# Patient Record
Sex: MALE | Race: WHITE | Employment: UNEMPLOYED | URBAN - METROPOLITAN AREA
[De-identification: names, ages, dates, MRNs, and addresses within clinical notes are randomized per-mention and may not be internally consistent; named-entity substitution may affect disease eponyms.]

---

## 2017-02-13 ENCOUNTER — HOSPITAL ENCOUNTER (EMERGENCY)
Age: 57
Discharge: HOME | End: 2017-02-13
Attending: EMERGENCY MEDICINE
Payer: MEDICAID

## 2017-02-13 VITALS
WEIGHT: 153.66 LBS | DIASTOLIC BLOOD PRESSURE: 72 MMHG | RESPIRATION RATE: 18 BRPM | BODY MASS INDEX: 20.81 KG/M2 | HEIGHT: 72 IN | SYSTOLIC BLOOD PRESSURE: 132 MMHG | HEART RATE: 62 BPM | OXYGEN SATURATION: 96 %

## 2017-02-13 DIAGNOSIS — R40.4 TRANSIENT ALTERATION OF AWARENESS: Primary | ICD-10-CM

## 2017-02-13 LAB
ANION GAP BLD CALC-SCNC: 11 MMOL/L (ref 5–15)
BASOPHILS # BLD AUTO: 0 K/UL
BASOPHILS # BLD: 0 %
BUN SERPL-MCNC: 9 MG/DL (ref 6–20)
BUN/CREAT SERPL: 13 (ref 12–20)
CALCIUM SERPL-MCNC: 8.1 MG/DL (ref 8.5–10.1)
CHLORIDE SERPL-SCNC: 90 MMOL/L (ref 97–108)
CO2 SERPL-SCNC: 29 MMOL/L (ref 21–32)
CREAT SERPL-MCNC: 0.7 MG/DL (ref 0.7–1.3)
DIFFERENTIAL METHOD BLD: ABNORMAL
EOSINOPHIL # BLD: 0 K/UL
EOSINOPHIL NFR BLD: 0 %
ERYTHROCYTE [DISTWIDTH] IN BLOOD BY AUTOMATED COUNT: 12.8 % (ref 11.5–14.5)
GLUCOSE SERPL-MCNC: 132 MG/DL (ref 65–100)
HCT VFR BLD AUTO: 36.1 % (ref 36.6–50.3)
HGB BLD-MCNC: 12.6 G/DL (ref 12.1–17)
LYMPHOCYTES # BLD AUTO: 15 %
LYMPHOCYTES # BLD: 2.9 K/UL
MCH RBC QN AUTO: 30.9 PG (ref 26–34)
MCHC RBC AUTO-ENTMCNC: 34.9 G/DL (ref 30–36.5)
MCV RBC AUTO: 88.5 FL (ref 80–99)
MONOCYTES # BLD: 1.4 K/UL
MONOCYTES NFR BLD AUTO: 7 %
NEUTS BAND NFR BLD MANUAL: 3 %
NEUTS SEG # BLD: 15.2 K/UL
NEUTS SEG NFR BLD AUTO: 75 %
NRBC # BLD: 0.12 K/UL (ref 0–0.01)
NRBC BLD-RTO: 0.6 PER 100 WBC
PLATELET # BLD AUTO: 536 K/UL (ref 150–400)
POTASSIUM SERPL-SCNC: 3.2 MMOL/L (ref 3.5–5.1)
RBC # BLD AUTO: 4.08 M/UL (ref 4.1–5.7)
RBC MORPH BLD: ABNORMAL
SODIUM SERPL-SCNC: 130 MMOL/L (ref 136–145)
WBC # BLD AUTO: 19.5 K/UL (ref 4.1–11.1)
WBC MORPH BLD: ABNORMAL
WBC NRBC COR # BLD: ABNORMAL 10*3/UL

## 2017-02-13 PROCEDURE — 85025 COMPLETE CBC W/AUTO DIFF WBC: CPT

## 2017-02-13 PROCEDURE — 96374 THER/PROPH/DIAG INJ IV PUSH: CPT

## 2017-02-13 PROCEDURE — 36415 COLL VENOUS BLD VENIPUNCTURE: CPT

## 2017-02-13 PROCEDURE — 74011000258 HC RX REV CODE- 258: Performed by: EMERGENCY MEDICINE

## 2017-02-13 PROCEDURE — 74011250636 HC RX REV CODE- 250/636: Performed by: EMERGENCY MEDICINE

## 2017-02-13 PROCEDURE — 80048 BASIC METABOLIC PNL TOTAL CA: CPT

## 2017-02-13 PROCEDURE — 99284 EMERGENCY DEPT VISIT MOD MDM: CPT

## 2017-02-13 RX ADMIN — LEVETIRACETAM 1000 MG: 100 INJECTION, SOLUTION, CONCENTRATE INTRAVENOUS at 13:58

## 2017-02-13 NOTE — ED TRIAGE NOTES
Pt. Calm and relaxed. Respirations unlabored and vitals within normal parameters.  Pt. Has poor recollection of why EMS was dispatched

## 2017-02-13 NOTE — ED PROVIDER NOTES
Patient is a 55 y.o. male presenting with altered mental status. The history is provided by the patient and the EMS personnel. No  was used. Altered mental status    This is a recurrent problem. The current episode started less than 1 hour ago. The problem has been resolved. Associated symptoms include seizures and unresponsiveness. Pertinent negatives include no weakness and no agitation. Confusion: resolved. Associated symptoms comments: Fecal incontinence. Mental status baseline is normal.  His past medical history is significant for diabetes, seizures, depression and heart disease. Past Medical History:   Diagnosis Date    Bipolar 1 disorder ([address]     Fluid retention     GERD (gastroesophageal reflux disease)     Hypertension     Other ill-defined conditions(799.89)      pancreatic stones    Other ill-defined conditions(799.89)      chronic pain    Psychiatric disorder      depression       Past Surgical History:   Procedure Laterality Date    Hx other surgical       spleen and part of pancreas removed         History reviewed. No pertinent family history. Social History     Social History    Marital status:      Spouse name: N/A    Number of children: N/A    Years of education: N/A     Occupational History    Not on file. Social History Main Topics    Smoking status: Heavy Tobacco Smoker     Packs/day: 1.00     Types: Cigarettes    Smokeless tobacco: Not on file      Comment: 30 cigarettes a day    Alcohol use No    Drug use: No    Sexual activity: Not on file     Other Topics Concern    Not on file     Social History Narrative    ** Merged History Encounter **              ALLERGIES: Iodinated contrast media - oral and iv dye; Iodine; and Zofran [ondansetron hcl (pf)]    Review of Systems   Constitutional: Negative for chills and fever. HENT: Negative for congestion and sore throat. Eyes: Negative for pain and visual disturbance.    Respiratory: Negative for cough and shortness of breath. Cardiovascular: Negative for chest pain and palpitations. Gastrointestinal: Negative for abdominal pain, constipation, diarrhea and nausea. Endocrine: Negative for polydipsia and polyuria. Genitourinary: Negative for dysuria and hematuria. Musculoskeletal: Negative for back pain and neck pain. Neurological: Positive for seizures. Negative for weakness and headaches. Psychiatric/Behavioral: Negative for agitation. Confusion: resolved. Vitals:    02/13/17 1206   BP: 114/66   Pulse: 62   Resp: 18   SpO2: 98%   Weight: 69.7 kg (153 lb 10.6 oz)   Height: 6' (1.829 m)            Physical Exam   Constitutional: He is oriented to person, place, and time. He appears well-developed and well-nourished. HENT:   Head: Normocephalic and atraumatic. Eyes: Conjunctivae are normal. Pupils are equal, round, and reactive to light. Neck: Normal range of motion. Neck supple. Cardiovascular: Normal rate, regular rhythm and normal heart sounds. Pulmonary/Chest: Effort normal and breath sounds normal.   Abdominal: Soft. He exhibits no distension. There is no tenderness. Musculoskeletal: Normal range of motion. Neurological: He is alert and oriented to person, place, and time. No cranial nerve deficit. Coordination normal.   Skin: Skin is warm and dry. No rash noted. Psychiatric: He has a normal mood and affect. His behavior is normal. Thought content normal.        MDM  Number of Diagnoses or Management Options  Diagnosis management comments: 56 yo M with hx HTN, DM, and recent hx of seizure disorder presents with altered mental status. Currently at baseline, per EMS report he was A&O only to self at group home and improved en route. + fecal incontinence. With rapid improvement and described sx seizure is likely. Had inpatient workup in June 2016, CT head normal and EEG WNL. No need for CT imaging in light of previous scan and normal neuro exam presently.  Pt states he is no longer taking buproprion. Will load with keppra 1g IV. Will check basic labs to evaluate for organic cause of AMS i.e. Electrolyte imbalance. Low suspicion for infection based on presentation. Risk of Complications, Morbidity, and/or Mortality  Presenting problems: moderate  Diagnostic procedures: minimal  Management options: low    Patient Progress  Patient progress: improved    ED Course   2:33 PM  Discussed situation with patient and his sister who is now present. As patient has had inpatient workup would likely not benefit from repeated admission at this time. He is at his baseline, has finished keppra load. Patient desires to seek remainder of his care at [facility] where his other specialists are. Will discharge. Procedures       LABORATORY TESTS:  No results found for this or any previous visit (from the past 12 hour(s)). IMAGING RESULTS:  No orders to display       MEDICATIONS GIVEN:  Medications   levETIRAcetam (KEPPRA) 1,000 mg in 0.9% sodium chloride IVPB (0 mg IntraVENous IV Completed 2/13/17 1413)       IMPRESSION:  1. Transient alteration of awareness        PLAN:  1. Discharge Medication List as of 2/13/2017  2:55 PM      CONTINUE these medications which have NOT CHANGED    Details   ibuprofen (ADVIL) 200 mg tablet Take  by mouth., Historical Med      lisinopril (PRINIVIL, ZESTRIL) 20 mg tablet Take 1 Tab by mouth daily. , Print, Disp-30 Tab, R-0      isosorbide mononitrate ER (IMDUR) 30 mg tablet Take 30 mg by mouth daily. , Historical Med      DULoxetine (CYMBALTA) 30 mg capsule Take 30 mg by mouth daily. , Historical Med      buPROPion SR (WELLBUTRIN SR) 100 mg SR tablet Take  by mouth., Historical Med      ARIPiprazole (ABILIFY) 15 mg tablet Take 15 mg by mouth daily. , Historical Med      divalproex ER (DEPAKOTE ER) 500 mg ER tablet Take 1,500 mg by mouth., Historical Med      traMADol (ULTRAM) 50 mg tablet Take 100 mg by mouth three (3) times daily as needed for Pain., Historical Med      pregabalin (LYRICA) 100 mg capsule Take  by mouth three (3) times daily. , Historical Med      insulin glargine (LANTUS SOLOSTAR) 100 unit/mL (3 mL) pen 18 Units by SubCUTAneous route daily. , Print, Disp-1 Package, R-0      atorvastatin (LIPITOR) 20 mg tablet Take 20 mg by mouth daily. , Historical Med      insulin lispro (HUMALOG) 100 unit/mL injection 4 Units by SubCUTAneous route Before breakfast, lunch, and dinner., Historical Med      lipase-protease-amylase (CREON) 36,000-114,000- 180,000 unit cpDR Take 1 capsule by mouth three (3) times daily (with meals). , Historical Med      carvedilol (COREG) 25 mg tablet Take 25 mg by mouth every twelve (12) hours every twelve (12) hours. , Historical Med           2.    Follow-up Information     Follow up With Details Comments [id number] Luana Tulou, MD In 1 week If symptoms worsen return to [address] [place] [address]           Return to ED if worse

## 2017-02-13 NOTE — ED NOTES
Pt remained in bathroom with running water, bathroom unlocked and nurse entered to assist patient with toileting and cleansed legs and rectum of soft stool, pt provided clean underwear and paper scrubs for comfort, ambulated with steady gait to room 39 where patient was meet by primary nurse, pt had white tennis shoes and cell phone in his possession, dirty pants and underwear placed in belonging bag

## 2018-02-09 ENCOUNTER — HOSPITAL ENCOUNTER (EMERGENCY)
Age: 58
Discharge: HOME | End: 2018-02-09
Attending: EMERGENCY MEDICINE
Payer: MEDICAID

## 2018-02-09 VITALS
SYSTOLIC BLOOD PRESSURE: 163 MMHG | TEMPERATURE: 97.5 F | OXYGEN SATURATION: 96 % | RESPIRATION RATE: 18 BRPM | HEART RATE: 58 BPM | DIASTOLIC BLOOD PRESSURE: 79 MMHG

## 2018-02-09 DIAGNOSIS — E16.2 HYPOGLYCEMIA: Primary | ICD-10-CM

## 2018-02-09 LAB
ALBUMIN SERPL-MCNC: 3.5 G/DL (ref 3.5–5)
ALBUMIN/GLOB SERPL: 1 {RATIO} (ref 1.1–2.2)
ALP SERPL-CCNC: 69 U/L (ref 45–117)
ALT SERPL-CCNC: 27 U/L (ref 12–78)
AMPHET UR QL SCN: NEGATIVE
ANION GAP SERPL CALC-SCNC: 5 MMOL/L (ref 5–15)
APAP SERPL-MCNC: <2 UG/ML (ref 10–30)
APPEARANCE UR: CLEAR
AST SERPL-CCNC: 27 U/L (ref 15–37)
BACTERIA URNS QL MICRO: NEGATIVE /HPF
BARBITURATES UR QL SCN: NEGATIVE
BASOPHILS # BLD: 0.1 K/UL (ref 0–0.1)
BASOPHILS NFR BLD: 1 % (ref 0–1)
BENZODIAZ UR QL: NEGATIVE
BILIRUB SERPL-MCNC: 0.2 MG/DL (ref 0.2–1)
BILIRUB UR QL: NEGATIVE
BUN SERPL-MCNC: 17 MG/DL (ref 6–20)
BUN/CREAT SERPL: 18 (ref 12–20)
CALCIUM SERPL-MCNC: 9.1 MG/DL (ref 8.5–10.1)
CANNABINOIDS UR QL SCN: NEGATIVE
CHLORIDE SERPL-SCNC: 103 MMOL/L (ref 97–108)
CO2 SERPL-SCNC: 32 MMOL/L (ref 21–32)
COCAINE UR QL SCN: NEGATIVE
COLOR UR: ABNORMAL
CREAT SERPL-MCNC: 0.96 MG/DL (ref 0.7–1.3)
DIFFERENTIAL METHOD BLD: ABNORMAL
DRUG SCRN COMMENT,DRGCM: NORMAL
EOSINOPHIL # BLD: 0.7 K/UL (ref 0–0.4)
EOSINOPHIL NFR BLD: 7 % (ref 0–7)
EPITH CASTS URNS QL MICRO: ABNORMAL /LPF
ERYTHROCYTE [DISTWIDTH] IN BLOOD BY AUTOMATED COUNT: 13.2 % (ref 11.5–14.5)
ETHANOL SERPL-MCNC: <10 MG/DL
GLOBULIN SER CALC-MCNC: 3.6 G/DL (ref 2–4)
GLUCOSE BLD STRIP.AUTO-MCNC: 190 MG/DL (ref 65–100)
GLUCOSE BLD STRIP.AUTO-MCNC: 61 MG/DL (ref 65–100)
GLUCOSE BLD STRIP.AUTO-MCNC: 66 MG/DL (ref 65–100)
GLUCOSE BLD STRIP.AUTO-MCNC: 73 MG/DL (ref 65–100)
GLUCOSE BLD STRIP.AUTO-MCNC: 94 MG/DL (ref 65–100)
GLUCOSE SERPL-MCNC: 37 MG/DL (ref 65–100)
GLUCOSE UR STRIP.AUTO-MCNC: NEGATIVE MG/DL
HCT VFR BLD AUTO: 39.4 % (ref 36.6–50.3)
HGB BLD-MCNC: 13.3 G/DL (ref 12.1–17)
HGB UR QL STRIP: NEGATIVE
HYALINE CASTS URNS QL MICRO: ABNORMAL /LPF (ref 0–5)
IMM GRANULOCYTES # BLD: 0 K/UL (ref 0–0.04)
IMM GRANULOCYTES NFR BLD AUTO: 0 % (ref 0–0.5)
KETONES UR QL STRIP.AUTO: NEGATIVE MG/DL
LEUKOCYTE ESTERASE UR QL STRIP.AUTO: NEGATIVE
LYMPHOCYTES # BLD: 3.9 K/UL (ref 0.8–3.5)
LYMPHOCYTES NFR BLD: 38 % (ref 12–49)
MAGNESIUM SERPL-MCNC: 2.1 MG/DL (ref 1.6–2.4)
MCH RBC QN AUTO: 31.2 PG (ref 26–34)
MCHC RBC AUTO-ENTMCNC: 33.8 G/DL (ref 30–36.5)
MCV RBC AUTO: 92.5 FL (ref 80–99)
METHADONE UR QL: NEGATIVE
MONOCYTES # BLD: 1.1 K/UL (ref 0–1)
MONOCYTES NFR BLD: 11 % (ref 5–13)
NEUTS SEG # BLD: 4.4 K/UL (ref 1.8–8)
NEUTS SEG NFR BLD: 43 % (ref 32–75)
NITRITE UR QL STRIP.AUTO: NEGATIVE
NRBC # BLD: 0 K/UL (ref 0–0.01)
NRBC BLD-RTO: 0 PER 100 WBC
OPIATES UR QL: NEGATIVE
PCP UR QL: NEGATIVE
PH UR STRIP: 6.5 [PH] (ref 5–8)
PLATELET # BLD AUTO: 213 K/UL (ref 150–400)
PMV BLD AUTO: 13.3 FL (ref 8.9–12.9)
POTASSIUM SERPL-SCNC: 4.3 MMOL/L (ref 3.5–5.1)
PROT SERPL-MCNC: 7.1 G/DL (ref 6.4–8.2)
PROT UR STRIP-MCNC: 100 MG/DL
RBC # BLD AUTO: 4.26 M/UL (ref 4.1–5.7)
RBC #/AREA URNS HPF: ABNORMAL /HPF (ref 0–5)
SALICYLATES SERPL-MCNC: 2.5 MG/DL (ref 2.8–20)
SERVICE CMNT-IMP: ABNORMAL
SERVICE CMNT-IMP: ABNORMAL
SERVICE CMNT-IMP: NORMAL
SODIUM SERPL-SCNC: 140 MMOL/L (ref 136–145)
SP GR UR REFRACTOMETRY: 1.01 (ref 1–1.03)
UA: UC IF INDICATED,UAUC: ABNORMAL
UROBILINOGEN UR QL STRIP.AUTO: 0.2 EU/DL (ref 0.2–1)
WBC # BLD AUTO: 10.3 K/UL (ref 4.1–11.1)
WBC URNS QL MICRO: ABNORMAL /HPF (ref 0–4)

## 2018-02-09 PROCEDURE — 80053 COMPREHEN METABOLIC PANEL: CPT | Performed by: EMERGENCY MEDICINE

## 2018-02-09 PROCEDURE — 85025 COMPLETE CBC W/AUTO DIFF WBC: CPT | Performed by: EMERGENCY MEDICINE

## 2018-02-09 PROCEDURE — 83735 ASSAY OF MAGNESIUM: CPT | Performed by: EMERGENCY MEDICINE

## 2018-02-09 PROCEDURE — 99285 EMERGENCY DEPT VISIT HI MDM: CPT

## 2018-02-09 PROCEDURE — 82962 GLUCOSE BLOOD TEST: CPT

## 2018-02-09 PROCEDURE — 80307 DRUG TEST PRSMV CHEM ANLYZR: CPT | Performed by: EMERGENCY MEDICINE

## 2018-02-09 PROCEDURE — 81001 URINALYSIS AUTO W/SCOPE: CPT | Performed by: EMERGENCY MEDICINE

## 2018-02-09 PROCEDURE — 36415 COLL VENOUS BLD VENIPUNCTURE: CPT | Performed by: EMERGENCY MEDICINE

## 2018-02-09 RX ORDER — DEXTROSE 50 % IN WATER (D50W) INTRAVENOUS SYRINGE
25
Status: DISCONTINUED | OUTPATIENT
Start: 2018-02-09 | End: 2018-02-09 | Stop reason: HOSPADM

## 2018-02-09 RX ORDER — DICYCLOMINE HYDROCHLORIDE 20 MG/1
20 TABLET ORAL EVERY 6 HOURS
COMMUNITY

## 2018-02-09 RX ORDER — GUAIFENESIN 100 MG/5ML
81 LIQUID (ML) ORAL DAILY
COMMUNITY

## 2018-02-09 RX ORDER — ARIPIPRAZOLE 15 MG/1
15 TABLET ORAL DAILY
COMMUNITY

## 2018-02-09 NOTE — ED NOTES
Discharge instructions reviewed with patient. Discharge instructions given to patient per Dr. Shahrzad Gerard. Patient able to return/verbalize discharge instructions. Copy of discharge instructions provided. Patient condition stable, respiratory status within normal limits, neuro status intact. Ambulatory out of ER. Patient provided with cab voucher.

## 2018-02-09 NOTE — ED NOTES
Bedside and Verbal shift change report given to Nena Irvin (oncoming nurse) by Savage Salcedo (offgoing nurse). Report included the following information SBAR, Kardex and Intake/Output.

## 2018-02-09 NOTE — DISCHARGE INSTRUCTIONS
Hypoglycemia: Care Instructions  Your Care Instructions    Hypoglycemia means that your blood sugar is low and your body is not getting enough fuel. Some people get low blood sugar from not eating often enough. Some medicines to treat diabetes can cause low blood sugar. People who have had surgery on their stomachs or intestines may get hypoglycemia. Problems with the pancreas, kidneys, or liver also can cause low blood sugar. A snack or drink with sugar in it will raise your blood sugar and should ease your symptoms right away. Your doctor may recommend that you change or stop your medicines until you can get your blood sugar levels under control. In the long run, you may need to change your diet and eating habits so that you get enough fuel for your body throughout the day. Follow-up care is a key part of your treatment and safety. Be sure to make and go to all appointments, and call your doctor if you are having problems. It's also a good idea to know your test results and keep a list of the medicines you take. How can you care for yourself at home? · Learn to recognize the early signs of low blood sugar. Signs include:  ¨ Nausea. ¨ Hunger. ¨ Feeling nervous, irritable, or shaky. ¨ Cold, clammy, wet skin. ¨ Sweating (when you are not exercising). ¨ A fast heartbeat. ¨ Numbness or tingling of the fingertips or lips. · If you feel an episode of low blood sugar coming on, drink fruit juice or sugared (not diet) soda, or eat sugar in the form of candy, cubes, or tablets. [address] are another [employer]. · Eat small, frequent meals so that you do not get too hungry between meals. · Balance extra exercise with eating more. · Keep a written record of your low blood sugar episodes, including when you last ate and what you ate, so that you can learn what causes your blood sugar to drop.   · Make sure your family, friends, and coworkers know the symptoms of low blood sugar and know what to do to get your sugar level up. · Wear medical alert jewelry that lists your condition. You can buy this at most drugstores. When should you call for help? Call 911 anytime you think you may need emergency care. For example, call if:  ? · You passed out (lost consciousness). ? · You are confused or cannot think clearly. ? · Your blood sugar is very high or very low. ? Watch closely for changes in your health, and be sure to contact your doctor if:  ? · Your blood sugar stays outside the level your doctor set for you. ? · You have any problems. Where can you learn more? Go to http://nicolas-tori.info/. Enter P880 in the search box to learn more about \"Hypoglycemia: Care Instructions. \"  Current as of: March 13, 2017  Content Version: 11.4  © 1468-1198 [employer]. Care instructions adapted under license by [employer] (which disclaims liability or warranty for this information). If you have questions about a medical condition or this instruction, always ask your healthcare professional. [place] [address] any warranty or liability for your use of this information.

## 2018-02-09 NOTE — ED NOTES
Unable to obtain an iv on patient. Dr. Mela Powell aware and okay with treating patient low blood sugar by mouth.  Pt was given apple juice at this time and will recheck blood sugar

## 2018-02-09 NOTE — ED NOTES
RN spoke to Juan about providing patient with cab ticket in order to get back to his vehicle. Primary RN made aware.

## 2018-02-09 NOTE — ED PROVIDER NOTES
EMERGENCY DEPARTMENT HISTORY AND PHYSICAL EXAM      Date: 2/9/2018  Patient Name: Geni Doyle    History of Presenting Illness     Chief Complaint   Patient presents with    Low Blood Sugar     Pt came in via EMS for c/o hypogylcemia whie out shopping Per patient he is a bad diabetic and has been having on going problems with his blood sugar. Per the patient he overdoses himself on insulin to kill himself. History Provided By: Patient    HPI: Geni Doyle, 65 y.o. male with PMHx significant for HTN, bipolar disorder, and and depression, presents via EMS to the ED for evaluation of low blood sugar. Pt reports he took his normal dose of insulin before breakfast this morning (12 units of Humalog prior to each meal) but only ate 3 boiled eggs, which is a low amount of intake for him. He called EMS because he was \"not feeling good\" due to low blood sugar. Pt denies any SI/HI or harmful intent today, but notes he has a hx of SI. Pt states his doctor recently increased his insulin by 2 units two weeks ago and he has not had any issues; he cites low food intake as the cause of this event. Pt has no complaints but notes he feels \"tired. \" He states he also takes 12 units of Lantis each night. Pt specifically denies any LOC, abdominal pain, nausea, vomiting, and diarrhea. PCP: Sussy Logan MD    There are no other complaints, changes, or physical findings at this time. Current Facility-Administered Medications   Medication Dose Route Frequency Provider Last Rate Last Dose    dextrose (D50W) injection syrg 25 g  25 g IntraVENous NOW Stacy Mendoza MD         Current Outpatient Prescriptions   Medication Sig Dispense Refill    ARIPiprazole (ABILIFY) 15 mg tablet Take 15 mg by mouth daily.  aspirin 81 mg chewable tablet Take 81 mg by mouth daily.  dicyclomine (BENTYL) 20 mg tablet Take 20 mg by mouth every six (6) hours.  ibuprofen (ADVIL) 200 mg tablet Take  by mouth.       lisinopril (PRINIVIL, ZESTRIL) 20 mg tablet Take 1 Tab by mouth daily. 30 Tab 0    DULoxetine (CYMBALTA) 30 mg capsule Take 30 mg by mouth daily.  ARIPiprazole (ABILIFY) 15 mg tablet Take 15 mg by mouth daily.  traMADol (ULTRAM) 50 mg tablet Take 100 mg by mouth three (3) times daily as needed for Pain.  pregabalin (LYRICA) 100 mg capsule Take  by mouth three (3) times daily.  insulin glargine (LANTUS SOLOSTAR) 100 unit/mL (3 mL) pen 18 Units by SubCUTAneous route daily. 1 Package 0    atorvastatin (LIPITOR) 20 mg tablet Take 20 mg by mouth daily.  insulin lispro (HUMALOG) 100 unit/mL injection 4 Units by SubCUTAneous route Before breakfast, lunch, and dinner.  lipase-protease-amylase (CREON) 36,000-114,000- 180,000 unit cpDR Take 1 capsule by mouth three (3) times daily (with meals).  carvedilol (COREG) 25 mg tablet Take 25 mg by mouth every twelve (12) hours every twelve (12) hours.  isosorbide mononitrate ER (IMDUR) 30 mg tablet Take 30 mg by mouth daily.  buPROPion SR (WELLBUTRIN SR) 100 mg SR tablet Take  by mouth.  divalproex ER (DEPAKOTE ER) 500 mg ER tablet Take 1,500 mg by mouth. Past History     Past Medical History:  Past Medical History:   Diagnosis Date    Bipolar 1 disorder ([facility] [address]     Fluid retention     GERD (gastroesophageal reflux disease)     Hypertension     Other ill-defined conditions(799.89)     pancreatic stones    Other ill-defined conditions(799.89)     chronic pain    Psychiatric disorder     depression       Past Surgical History:  Past Surgical History:   Procedure Laterality Date    HX OTHER SURGICAL      spleen and part of pancreas removed       Family History:  History reviewed. No pertinent family history.     Social History:  Social History   Substance Use Topics    Smoking status: Heavy Tobacco Smoker     Packs/day: 1.00     Types: Cigarettes    Smokeless tobacco: Never Used      Comment: 30 cigarettes a day    Alcohol use No Allergies: Allergies   Allergen Reactions    Iodinated Contrast- Oral And Iv Dye Rash    Iodine Rash    Zofran [Ondansetron Hcl (Pf)] Rash         Review of Systems   Review of Systems   Constitutional: Negative for activity change, chills and fever.        + low blood sugar   HENT: Negative for congestion and sore throat. Eyes: Negative for pain and redness. Respiratory: Negative for cough, chest tightness and shortness of breath. Cardiovascular: Negative for chest pain and palpitations. Gastrointestinal: Negative for abdominal pain, diarrhea, nausea and vomiting. Genitourinary: Negative for dysuria, frequency and urgency. Musculoskeletal: Negative for back pain and neck pain. Skin: Negative for rash. Neurological: Negative for syncope, light-headedness and headaches. Psychiatric/Behavioral: Negative for confusion. All other systems reviewed and are negative. Physical Exam   Physical Exam   Constitutional: He is oriented to person, place, and time. He appears well-developed and well-nourished. No distress. HENT:   Head: Normocephalic. Nose: Nose normal.   Mouth/Throat: Oropharynx is clear and moist. No oropharyngeal exudate. Eyes: Conjunctivae are normal. Pupils are equal, round, and reactive to light. No scleral icterus. Neck: Normal range of motion. Neck supple. No JVD present. No tracheal deviation present. No thyromegaly present. Cardiovascular: Normal rate, regular rhythm and intact distal pulses. Exam reveals no gallop and no friction rub. No murmur heard. Pulmonary/Chest: Effort normal and breath sounds normal. No stridor. No respiratory distress. He has no wheezes. He has no rales. Abdominal: Soft. Bowel sounds are normal. He exhibits no distension. There is no tenderness. There is no rebound and no guarding. Musculoskeletal: Normal range of motion. He exhibits no edema. Lymphadenopathy:     He has no cervical adenopathy.    Neurological: He is alert and oriented to person, place, and time. No cranial nerve deficit. He exhibits normal muscle tone. Coordination normal.   Skin: Skin is warm and dry. No rash noted. He is not diaphoretic. No erythema. Psychiatric: He has a normal mood and affect. His behavior is normal.   Denies SI/HI, denies AH/VH. Nursing note and vitals reviewed. Diagnostic Study Results     Labs -     Recent Results (from the past 12 hour(s))   GLUCOSE, POC    Collection Time: 02/09/18 10:53 AM   Result Value Ref Range    Glucose (POC) 61 (L) 65 - 100 mg/dL    Performed by Sonal Mccartney    CBC WITH AUTOMATED DIFF    Collection Time: 02/09/18 11:12 AM   Result Value Ref Range    WBC 10.3 4.1 - 11.1 K/uL    RBC 4.26 4.10 - 5.70 M/uL    HGB 13.3 12.1 - 17.0 g/dL    HCT 39.4 36.6 - 50.3 %    MCV 92.5 80.0 - 99.0 FL    MCH 31.2 26.0 - 34.0 PG    MCHC 33.8 30.0 - 36.5 g/dL    RDW 13.2 11.5 - 14.5 %    PLATELET 831 850 - 345 K/uL    MPV 13.3 (H) 8.9 - 12.9 FL    NRBC 0.0 0  WBC    ABSOLUTE NRBC 0.00 0.00 - 0.01 K/uL    NEUTROPHILS 43 32 - 75 %    LYMPHOCYTES 38 12 - 49 %    MONOCYTES 11 5 - 13 %    EOSINOPHILS 7 0 - 7 %    BASOPHILS 1 0 - 1 %    IMMATURE GRANULOCYTES 0 0.0 - 0.5 %    ABS. NEUTROPHILS 4.4 1.8 - 8.0 K/UL    ABS. LYMPHOCYTES 3.9 (H) 0.8 - 3.5 K/UL    ABS. MONOCYTES 1.1 (H) 0.0 - 1.0 K/UL    ABS. EOSINOPHILS 0.7 (H) 0.0 - 0.4 K/UL    ABS. BASOPHILS 0.1 0.0 - 0.1 K/UL    ABS. IMM.  GRANS. 0.0 0.00 - 0.04 K/UL    DF AUTOMATED     ETHYL ALCOHOL    Collection Time: 02/09/18 11:12 AM   Result Value Ref Range    ALCOHOL(ETHYL),SERUM <10 <10 MG/DL   ACETAMINOPHEN    Collection Time: 02/09/18 11:12 AM   Result Value Ref Range    Acetaminophen level <2 (L) 10 - 30 ug/mL   SALICYLATE    Collection Time: 02/09/18 11:12 AM   Result Value Ref Range    Salicylate level 2.5 (L) 2.8 - 20.0 MG/DL   GLUCOSE, POC    Collection Time: 02/09/18 11:25 AM   Result Value Ref Range    Glucose (POC) 73 65 - 100 mg/dL    Performed by Hari Llanes Lesley    URINALYSIS W/ REFLEX CULTURE    Collection Time: 02/09/18 11:52 AM   Result Value Ref Range    Color YELLOW/STRAW      Appearance CLEAR CLEAR      Specific gravity 1.009 1.003 - 1.030      pH (UA) 6.5 5.0 - 8.0      Protein 100 (A) NEG mg/dL    Glucose NEGATIVE  NEG mg/dL    Ketone NEGATIVE  NEG mg/dL    Bilirubin NEGATIVE  NEG      Blood NEGATIVE  NEG      Urobilinogen 0.2 0.2 - 1.0 EU/dL    Nitrites NEGATIVE  NEG      Leukocyte Esterase NEGATIVE  NEG      WBC 0-4 0 - 4 /hpf    RBC 0-5 0 - 5 /hpf    Epithelial cells FEW FEW /lpf    Bacteria NEGATIVE  NEG /hpf    UA:UC IF INDICATED CULTURE NOT INDICATED BY UA RESULT CNI      Hyaline cast 0-2 0 - 5 /lpf   DRUG SCREEN, URINE    Collection Time: 02/09/18 11:52 AM   Result Value Ref Range    AMPHETAMINES NEGATIVE  NEG      BARBITURATES NEGATIVE  NEG      BENZODIAZEPINES NEGATIVE  NEG      COCAINE NEGATIVE  NEG      METHADONE NEGATIVE  NEG      OPIATES NEGATIVE  NEG      PCP(PHENCYCLIDINE) NEGATIVE  NEG      THC (TH-CANNABINOL) NEGATIVE  NEG      Drug screen comment (NOTE)    GLUCOSE, POC    Collection Time: 02/09/18 11:59 AM   Result Value Ref Range    Glucose (POC) 66 65 - 100 mg/dL    Performed by Nicky Holt    METABOLIC PANEL, COMPREHENSIVE    Collection Time: 02/09/18 12:08 PM   Result Value Ref Range    Sodium 140 136 - 145 mmol/L    Potassium 4.3 3.5 - 5.1 mmol/L    Chloride 103 97 - 108 mmol/L    CO2 32 21 - 32 mmol/L    Anion gap 5 5 - 15 mmol/L    Glucose 37 (LL) 65 - 100 mg/dL    BUN 17 6 - 20 MG/DL    Creatinine 0.96 0.70 - 1.30 MG/DL    BUN/Creatinine ratio 18 12 - 20      GFR est AA >60 >60 ml/min/1.73m2    GFR est non-AA >60 >60 ml/min/1.73m2    Calcium 9.1 8.5 - 10.1 MG/DL    Bilirubin, total 0.2 0.2 - 1.0 MG/DL    ALT (SGPT) 27 12 - 78 U/L    AST (SGOT) 27 15 - 37 U/L    Alk.  phosphatase 69 45 - 117 U/L    Protein, total 7.1 6.4 - 8.2 g/dL    Albumin 3.5 3.5 - 5.0 g/dL    Globulin 3.6 2.0 - 4.0 g/dL    A-G Ratio 1.0 (L) 1.1 - 2.2 MAGNESIUM    Collection Time: 02/09/18 12:08 PM   Result Value Ref Range    Magnesium 2.1 1.6 - 2.4 mg/dL   GLUCOSE, POC    Collection Time: 02/09/18 12:35 PM   Result Value Ref Range    Glucose (POC) 94 65 - 100 mg/dL    Performed by Sameer Estrada    GLUCOSE, POC    Collection Time: 02/09/18  2:31 PM   Result Value Ref Range    Glucose (POC) 190 (H) 65 - 100 mg/dL    Performed by Eduardo Francesca (SON)        Medical Decision Making   I am the first provider for this patient. I reviewed the vital signs, available nursing notes, past medical history, past surgical history, family history and social history. Vital Signs-Reviewed the patient's vital signs. Patient Vitals for the past 12 hrs:   Temp Pulse Resp BP SpO2   02/09/18 1100 97.5 °F (36.4 °C) (!) 58 18 135/64 100 %     Pulse Oximetry Analysis - 99% on RA    Cardiac Monitor:   Rate: 60 bpm    Records Reviewed: Nursing Notes, Old Medical Records and Ambulance Run Sheet    Provider Notes (Medical Decision Making):   DDx: Hypoglycemia, metabolic abnormality. ED Course:   Initial assessment performed. The patients presenting problems have been discussed, and they are in agreement with the care plan formulated and outlined with them. I have encouraged them to ask questions as they arise throughout their visit. Disposition:  DISCHARGE NOTE  2:56 PM  The patient has been re-evaluated and is ready for discharge. Blood sugar normalized; pt denied any harmful/suicidal intent, just simply didn't eat as much as usual.  Reviewed available results with patient. Counseled patient on diagnosis and care plan. Patient has expressed understanding, and all questions have been answered. Patient agrees with plan and agrees to follow up as recommended, or return to the ED if their symptoms worsen. Discharge instructions have been provided and explained to the patient, along with reasons to return to the ED. PLAN:  1.    Current Discharge Medication List 2.   Follow-up Information     Follow up With Details Comments Kevin Vinson MD Schedule an appointment as soon as possible for a visit in 3 days  [address]  [address] [facility] [address]  [phone]      [facility] EMERGENCY DEPT Go in 1 day If symptoms worsen [address] [place] [address]  [phone]        Return to ED if worse     Diagnosis     Clinical Impression:   1. Hypoglycemia        Attestations: This note is prepared by Carlee Alamguer, acting as Scribe for Layla Garcia MD.      The scribe's documentation has been prepared under my direction and personally reviewed by me in its entirety. I confirm that the note above accurately reflects all work, treatment, procedures, and medical decision making performed by me.   Layla Garcia MD

## 2018-02-09 NOTE — ED NOTES
Assumed care of pt. Pt. Placed in position of comfort. Call bell within reach. Pt. Made aware of care plan. Pt came in with c/o hypoglycemia. Per patient he overdose himself on insulin bc he wants to kill himself. Pt stated he doesn't have a plan to hurt himself today or while he is here.